# Patient Record
Sex: MALE | Race: BLACK OR AFRICAN AMERICAN | Employment: PART TIME | URBAN - METROPOLITAN AREA
[De-identification: names, ages, dates, MRNs, and addresses within clinical notes are randomized per-mention and may not be internally consistent; named-entity substitution may affect disease eponyms.]

---

## 2019-05-10 NOTE — PRE-PROCEDURE
Oral and Maxillofacial Surgery  Pre-Op Note    Surgery Date: 5/15/19    Pre-op Dx: Gross caries and periodontal disease    Planned Procedure: Extraction of remaining dentition (teeth #5, 6, 11, 21, 22, 27, 28, 29), bilateral mandibular jerri removal, and alveoloplasty of bilateral maxillary buccal exostoses.    Planned Anesthesia: GA w/ ETT    Allergies: NKDA    Pre-Operative Medications: Per anesthesia    Resident Surgeon:   JOEL Land DDS    Staff Surgeon:   Amelia Owusu DDS    Risks and Complications discussed with patient/guardian/parents to include, but not limited to bleeding, infection, swelling, pain, temporary/permanent hypoesthesia/paresthesia CN V3 mental nerve/CN V2 maxillary nerve distribution, failure of treatment, need for additional  treatment/procedures. Consent will be written. All questions were answered.    Edwin Posada DDS  [facility] [place] resident  Pager: [phone]    [facility] Procedure Note - Aborted    Patient presented to clinic for extraction of remaining dentition (teeth #5, 6, 11, 21, 22, 27, 28, 29), bilateral mandibular jerri removal, and alveoloplasty of bilateral maxillary buccal exostoses.  Patient was very apprehensive about the procedure and his dental student, Anson, came down to talk to him about his treatment plan.  Patient visibly very anxious and agitated throughout conversation, but agreed to proceed using local anesthesia and nitrous oxide (free of charge).      Pre-op dx: Chronic generalized periodontitis, bilateral mandibular jerri, bilateral maxillary buccal exostoses, hypertension, bipolar disorder, depression, anxiety, asthma.  Post-op dx: Same.    BP: 142/99    N20 3 L/min, O2 3 L/min via nasal henriquez x 10 min.  100% O2 3 L/min x 5 min post-op.    Description of Procedure:  Patient was seated in the dental chair and nitrous oxide was administered via nasal henriquez.  Placed topical anesthesia.  Patient was very agitated throughout this process.  Upon  attempt to deliver local anesthesia, the patient would not open and stated he was going to grab this provider's hand if he felt anything.  Informed the patient that this was not safe and we would not be able to complete the procedure if he grabbed the hands of the staff during the operation.  Patient continued to be uncooperative for local anesthesia injection and the decision was made to abort the procedure due to concern for patient and provider safety.  O2 administered for 5 minutes upon termination of nitrous.    Disposition:  The patient will be taken to the OR for the procedure as he was unable to tolerate the procedure in clinic and he is not an in office IV sedation candiate.  Insurance prior authorization will be sent and patient will be contacted to schedule pending approval.    The case was discussed with staff surgeon Dr. Amelia Wilson, GARETH  Oral & Maxillofacial Surgery PGY3        See student/resident's note above for details. As the approving(supervising) , I attest that I've seen and evaluated the patient, was present for the critical or key portions of the treatment and agree with the student/resident's treatments, findings and plans as written.  Electronically signed by: Amelia Walton 11/20/2018 @ 04:22:00 PM

## 2019-05-14 ENCOUNTER — ANESTHESIA EVENT (OUTPATIENT)
Dept: SURGERY | Facility: CLINIC | Age: 49
End: 2019-05-14
Payer: MEDICARE

## 2019-05-14 ASSESSMENT — LIFESTYLE VARIABLES: TOBACCO_USE: 1

## 2019-05-14 NOTE — ANESTHESIA PREPROCEDURE EVALUATION
Anesthesia Pre-Procedure Evaluation    Patient: Jasson George   MRN:     [id number] Gender:   male   Age:    48 year old :      1970        Preoperative Diagnosis: Chronic Generalized Periodontitis, Bilateral Mandibular Sara, Bilateral Maxillary Buccal Exostoses, Hypertension, Bipolar Disorder, Depression, Anxiety, Asthma   Procedure(s):  Removal Bilateral Mandible Sara  Alveoloplasty Four Quadrants  Extraction #5,6,11,21,22,27,28,29     No past medical history on file.   No past surgical history on file.       Anesthesia Evaluation     . Pt has not had prior anesthetic            ROS/MED HX    ENT/Pulmonary:     (+)tobacco use, 1 packs/day  asthma , . .    Neurologic:  - neg neurologic ROS     Cardiovascular:     (+) hypertension----. : . . . :. . No previous cardiac testing       METS/Exercise Tolerance:     Hematologic:  - neg hematologic  ROS       Musculoskeletal:  - neg musculoskeletal ROS       GI/Hepatic:  - neg GI/hepatic ROS       Renal/Genitourinary:  - ROS Renal section negative       Endo:     (+) Obesity, .      Psychiatric:     (+) psychiatric history anxiety, bipolar and depression      Infectious Disease:  - neg infectious disease ROS       Malignancy:      - no malignancy   Other:    (+) C-spine cleared: N/A, no H/O Chronic Pain,no other significant disability                        PHYSICAL EXAM:   Mental Status/Neuro: A/A/O   Airway: Facies: Feasible  Mallampati: I  Mouth/Opening: Full  TM distance: > 6 cm  Neck ROM: Full   Respiratory: Auscultation: CTAB     Resp. Rate: Normal     Resp. Effort: Normal      CV: Rhythm: Regular  Rate: Age appropriate  Heart: Normal Sounds   Comments:                    No results found for: WBC, HGB, HCT, PLT, CRP, SED, NA, POTASSIUM, CHLORIDE, CO2, BUN, CR, GLC, VIOLA, PHOS, MAG, ALBUMIN, PROTTOTAL, ALT, AST, GGT, ALKPHOS, BILITOTAL, BILIDIRECT, LIPASE, AMYLASE, ANGELICA, PTT, INR, FIBR, TSH, T4, T3, HCG, HCGS, CKTOTAL, CKMB, TROPN    Preop Vitals  BP  Readings from Last 3 Encounters:   No data found for BP    Pulse Readings from Last 3 Encounters:   No data found for Pulse      Resp Readings from Last 3 Encounters:   No data found for Resp    SpO2 Readings from Last 3 Encounters:   No data found for SpO2      Temp Readings from Last 1 Encounters:   No data found for Temp    Ht Readings from Last 1 Encounters:   No data found for Ht      Wt Readings from Last 1 Encounters:   No data found for Wt    There is no height or weight on file to calculate BMI.     LDA:            Assessment:   ASA SCORE: 2       Documentation: H&P complete; Preop Testing complete; Consents complete   Proceeding: Proceed without further delay  Tobacco Use:  NO Active use of Tobacco/UNKNOWN Tobacco use status     Plan:   Anes. Type:  General   Pre-Induction: Midazolam IV; Acetaminophen PO   Induction:  IV (Standard)   Airway: Nasal ETT   Access/Monitoring: PIV   Maintenance: Balanced   Emergence: Procedure Site   Logistics: Same Day Surgery     Postop Pain/Sedation Strategy:  Standard-Options: Opioids PRN     PONV Management:  Adult Risk Factors:, Non-Smoker, Postop Opioids  Prevention: Ondansetron; Dexamethasone                         Brandi Delarosa MD

## 2019-05-15 ENCOUNTER — HOSPITAL ENCOUNTER (OUTPATIENT)
Facility: CLINIC | Age: 49
Discharge: HOME | End: 2019-05-15
Attending: DENTIST | Admitting: DENTIST
Payer: MEDICARE

## 2019-05-15 ENCOUNTER — ANESTHESIA (OUTPATIENT)
Dept: SURGERY | Facility: CLINIC | Age: 49
End: 2019-05-15
Payer: MEDICARE

## 2019-05-15 VITALS
OXYGEN SATURATION: 94 % | DIASTOLIC BLOOD PRESSURE: 98 MMHG | HEART RATE: 80 BPM | TEMPERATURE: 98.3 F | RESPIRATION RATE: 16 BRPM | BODY MASS INDEX: 39.4 KG/M2 | HEIGHT: 66 IN | SYSTOLIC BLOOD PRESSURE: 148 MMHG | WEIGHT: 245.15 LBS

## 2019-05-15 DIAGNOSIS — K08.409 S/P TOOTH EXTRACTION: Primary | ICD-10-CM

## 2019-05-15 LAB
GLUCOSE BLDC GLUCOMTR-MCNC: 107 MG/DL (ref 70–99)
HGB BLD-MCNC: 15.3 G/DL (ref 13.3–17.7)

## 2019-05-15 PROCEDURE — 36000064 ZZH SURGERY LEVEL 4 EA 15 ADDTL MIN - UMMC: Performed by: DENTIST

## 2019-05-15 PROCEDURE — 85018 HEMOGLOBIN: CPT | Performed by: ANESTHESIOLOGY

## 2019-05-15 PROCEDURE — 25000128 H RX IP 250 OP 636: Performed by: STUDENT IN AN ORGANIZED HEALTH CARE EDUCATION/TRAINING PROGRAM

## 2019-05-15 PROCEDURE — 71000027 ZZH RECOVERY PHASE 2 EACH 15 MINS: Performed by: DENTIST

## 2019-05-15 PROCEDURE — 36000062 ZZH SURGERY LEVEL 4 1ST 30 MIN - UMMC: Performed by: DENTIST

## 2019-05-15 PROCEDURE — 82962 GLUCOSE BLOOD TEST: CPT

## 2019-05-15 PROCEDURE — 25000566 ZZH SEVOFLURANE, EA 15 MIN: Performed by: DENTIST

## 2019-05-15 PROCEDURE — A9270 NON-COVERED ITEM OR SERVICE: HCPCS | Mod: GY | Performed by: STUDENT IN AN ORGANIZED HEALTH CARE EDUCATION/TRAINING PROGRAM

## 2019-05-15 PROCEDURE — 25000132 ZZH RX MED GY IP 250 OP 250 PS 637: Mod: GY | Performed by: STUDENT IN AN ORGANIZED HEALTH CARE EDUCATION/TRAINING PROGRAM

## 2019-05-15 PROCEDURE — 25800030 ZZH RX IP 258 OP 636: Performed by: ANESTHESIOLOGY

## 2019-05-15 PROCEDURE — A9270 NON-COVERED ITEM OR SERVICE: HCPCS | Performed by: ANESTHESIOLOGY

## 2019-05-15 PROCEDURE — 37000008 ZZH ANESTHESIA TECHNICAL FEE, 1ST 30 MIN: Performed by: DENTIST

## 2019-05-15 PROCEDURE — 27210794 ZZH OR GENERAL SUPPLY STERILE: Performed by: DENTIST

## 2019-05-15 PROCEDURE — 25000132 ZZH RX MED GY IP 250 OP 250 PS 637: Performed by: ANESTHESIOLOGY

## 2019-05-15 PROCEDURE — 40000170 ZZH STATISTIC PRE-PROCEDURE ASSESSMENT II: Performed by: DENTIST

## 2019-05-15 PROCEDURE — 25000125 ZZHC RX 250: Performed by: STUDENT IN AN ORGANIZED HEALTH CARE EDUCATION/TRAINING PROGRAM

## 2019-05-15 PROCEDURE — 71000015 ZZH RECOVERY PHASE 1 LEVEL 2 EA ADDTL HR: Performed by: DENTIST

## 2019-05-15 PROCEDURE — 37000009 ZZH ANESTHESIA TECHNICAL FEE, EACH ADDTL 15 MIN: Performed by: DENTIST

## 2019-05-15 PROCEDURE — 71000014 ZZH RECOVERY PHASE 1 LEVEL 2 FIRST HR: Performed by: DENTIST

## 2019-05-15 PROCEDURE — 25000125 ZZHC RX 250: Performed by: DENTIST

## 2019-05-15 RX ORDER — IBUPROFEN 600 MG/1
600 TABLET, FILM COATED ORAL EVERY 6 HOURS PRN
Qty: 30 TABLET | Refills: 0 | Status: SHIPPED | OUTPATIENT
Start: 2019-05-15

## 2019-05-15 RX ORDER — CHLORHEXIDINE GLUCONATE ORAL RINSE 1.2 MG/ML
10 SOLUTION DENTAL ONCE
Status: COMPLETED | OUTPATIENT
Start: 2019-05-15 | End: 2019-05-15

## 2019-05-15 RX ORDER — CHLORHEXIDINE GLUCONATE ORAL RINSE 1.2 MG/ML
SOLUTION DENTAL
Qty: 473 ML | Refills: 0 | Status: SHIPPED | OUTPATIENT
Start: 2019-05-15

## 2019-05-15 RX ORDER — ACETAMINOPHEN 325 MG/1
650 TABLET ORAL ONCE
Status: COMPLETED | OUTPATIENT
Start: 2019-05-15 | End: 2019-05-15

## 2019-05-15 RX ORDER — BUPIVACAINE HYDROCHLORIDE AND EPINEPHRINE 5; 5 MG/ML; UG/ML
INJECTION, SOLUTION PERINEURAL PRN
Status: DISCONTINUED | OUTPATIENT
Start: 2019-05-15 | End: 2019-05-15 | Stop reason: HOSPADM

## 2019-05-15 RX ORDER — SODIUM CHLORIDE, SODIUM LACTATE, POTASSIUM CHLORIDE, CALCIUM CHLORIDE 600; 310; 30; 20 MG/100ML; MG/100ML; MG/100ML; MG/100ML
INJECTION, SOLUTION INTRAVENOUS CONTINUOUS
Status: DISCONTINUED | OUTPATIENT
Start: 2019-05-15 | End: 2019-05-15 | Stop reason: HOSPADM

## 2019-05-15 RX ORDER — NALOXONE HYDROCHLORIDE 0.4 MG/ML
.1-.4 INJECTION, SOLUTION INTRAMUSCULAR; INTRAVENOUS; SUBCUTANEOUS
Status: DISCONTINUED | OUTPATIENT
Start: 2019-05-15 | End: 2019-05-15 | Stop reason: HOSPADM

## 2019-05-15 RX ORDER — LABETALOL 20 MG/4 ML (5 MG/ML) INTRAVENOUS SYRINGE
10
Status: DISCONTINUED | OUTPATIENT
Start: 2019-05-15 | End: 2019-05-15 | Stop reason: HOSPADM

## 2019-05-15 RX ORDER — ONDANSETRON 2 MG/ML
INJECTION INTRAMUSCULAR; INTRAVENOUS PRN
Status: DISCONTINUED | OUTPATIENT
Start: 2019-05-15 | End: 2019-05-15

## 2019-05-15 RX ORDER — ONDANSETRON 2 MG/ML
4 INJECTION INTRAMUSCULAR; INTRAVENOUS EVERY 30 MIN PRN
Status: DISCONTINUED | OUTPATIENT
Start: 2019-05-15 | End: 2019-05-15 | Stop reason: HOSPADM

## 2019-05-15 RX ORDER — FENTANYL CITRATE 50 UG/ML
25-50 INJECTION, SOLUTION INTRAMUSCULAR; INTRAVENOUS
Status: DISCONTINUED | OUTPATIENT
Start: 2019-05-15 | End: 2019-05-15 | Stop reason: HOSPADM

## 2019-05-15 RX ORDER — DEXAMETHASONE SODIUM PHOSPHATE 4 MG/ML
INJECTION, SOLUTION INTRA-ARTICULAR; INTRALESIONAL; INTRAMUSCULAR; INTRAVENOUS; SOFT TISSUE PRN
Status: DISCONTINUED | OUTPATIENT
Start: 2019-05-15 | End: 2019-05-15

## 2019-05-15 RX ORDER — CEFAZOLIN SODIUM 2 G/100ML
2 INJECTION, SOLUTION INTRAVENOUS
Status: COMPLETED | OUTPATIENT
Start: 2019-05-15 | End: 2019-05-15

## 2019-05-15 RX ORDER — ONDANSETRON 4 MG/1
4 TABLET, ORALLY DISINTEGRATING ORAL EVERY 30 MIN PRN
Status: DISCONTINUED | OUTPATIENT
Start: 2019-05-15 | End: 2019-05-15 | Stop reason: HOSPADM

## 2019-05-15 RX ORDER — PROPOFOL 10 MG/ML
INJECTION, EMULSION INTRAVENOUS PRN
Status: DISCONTINUED | OUTPATIENT
Start: 2019-05-15 | End: 2019-05-15

## 2019-05-15 RX ORDER — CEFAZOLIN SODIUM 1 G/3ML
1 INJECTION, POWDER, FOR SOLUTION INTRAMUSCULAR; INTRAVENOUS SEE ADMIN INSTRUCTIONS
Status: DISCONTINUED | OUTPATIENT
Start: 2019-05-15 | End: 2019-05-15 | Stop reason: HOSPADM

## 2019-05-15 RX ORDER — FENTANYL CITRATE 50 UG/ML
INJECTION, SOLUTION INTRAMUSCULAR; INTRAVENOUS PRN
Status: DISCONTINUED | OUTPATIENT
Start: 2019-05-15 | End: 2019-05-15

## 2019-05-15 RX ORDER — LIDOCAINE HYDROCHLORIDE 20 MG/ML
INJECTION, SOLUTION INFILTRATION; PERINEURAL PRN
Status: DISCONTINUED | OUTPATIENT
Start: 2019-05-15 | End: 2019-05-15

## 2019-05-15 RX ORDER — IBUPROFEN 600 MG/1
600 TABLET, FILM COATED ORAL EVERY 6 HOURS PRN
Status: DISCONTINUED | OUTPATIENT
Start: 2019-05-15 | End: 2019-05-15 | Stop reason: HOSPADM

## 2019-05-15 RX ORDER — MEPERIDINE HYDROCHLORIDE 25 MG/ML
12.5 INJECTION INTRAMUSCULAR; INTRAVENOUS; SUBCUTANEOUS
Status: DISCONTINUED | OUTPATIENT
Start: 2019-05-15 | End: 2019-05-15 | Stop reason: HOSPADM

## 2019-05-15 RX ORDER — ACETAMINOPHEN 325 MG/1
650 TABLET ORAL EVERY 4 HOURS PRN
Qty: 50 TABLET | Refills: 0 | Status: SHIPPED | OUTPATIENT
Start: 2019-05-15

## 2019-05-15 RX ORDER — LABETALOL 20 MG/4 ML (5 MG/ML) INTRAVENOUS SYRINGE
PRN
Status: DISCONTINUED | OUTPATIENT
Start: 2019-05-15 | End: 2019-05-15

## 2019-05-15 RX ORDER — LIDOCAINE 40 MG/G
CREAM TOPICAL
Status: DISCONTINUED | OUTPATIENT
Start: 2019-05-15 | End: 2019-05-15 | Stop reason: HOSPADM

## 2019-05-15 RX ADMIN — FENTANYL CITRATE 50 MCG: 50 INJECTION, SOLUTION INTRAMUSCULAR; INTRAVENOUS at 11:55

## 2019-05-15 RX ADMIN — ROCURONIUM BROMIDE 50 MG: 10 INJECTION INTRAVENOUS at 11:25

## 2019-05-15 RX ADMIN — FENTANYL CITRATE 100 MCG: 50 INJECTION, SOLUTION INTRAMUSCULAR; INTRAVENOUS at 11:25

## 2019-05-15 RX ADMIN — LABETALOL 20 MG/4 ML (5 MG/ML) INTRAVENOUS SYRINGE 5 MG: at 12:17

## 2019-05-15 RX ADMIN — MIDAZOLAM 1 MG: 1 INJECTION INTRAMUSCULAR; INTRAVENOUS at 11:17

## 2019-05-15 RX ADMIN — CEFAZOLIN SODIUM 2 G: 2 INJECTION, SOLUTION INTRAVENOUS at 11:43

## 2019-05-15 RX ADMIN — ROCURONIUM BROMIDE 20 MG: 10 INJECTION INTRAVENOUS at 12:08

## 2019-05-15 RX ADMIN — ONDANSETRON 4 MG: 2 INJECTION INTRAMUSCULAR; INTRAVENOUS at 12:01

## 2019-05-15 RX ADMIN — LIDOCAINE HYDROCHLORIDE 100 MG: 20 INJECTION, SOLUTION INFILTRATION; PERINEURAL at 11:25

## 2019-05-15 RX ADMIN — ROCURONIUM BROMIDE 10 MG: 10 INJECTION INTRAVENOUS at 12:45

## 2019-05-15 RX ADMIN — SUGAMMADEX 250 MG: 100 INJECTION, SOLUTION INTRAVENOUS at 13:16

## 2019-05-15 RX ADMIN — PROPOFOL 30 MG: 10 INJECTION, EMULSION INTRAVENOUS at 11:54

## 2019-05-15 RX ADMIN — PROPOFOL 150 MG: 10 INJECTION, EMULSION INTRAVENOUS at 11:25

## 2019-05-15 RX ADMIN — FENTANYL CITRATE 50 MCG: 50 INJECTION, SOLUTION INTRAMUSCULAR; INTRAVENOUS at 12:10

## 2019-05-15 RX ADMIN — DEXAMETHASONE SODIUM PHOSPHATE 6 MG: 4 INJECTION, SOLUTION INTRA-ARTICULAR; INTRALESIONAL; INTRAMUSCULAR; INTRAVENOUS; SOFT TISSUE at 11:49

## 2019-05-15 RX ADMIN — LABETALOL 20 MG/4 ML (5 MG/ML) INTRAVENOUS SYRINGE 5 MG: at 12:22

## 2019-05-15 RX ADMIN — SODIUM CHLORIDE, POTASSIUM CHLORIDE, SODIUM LACTATE AND CALCIUM CHLORIDE: 600; 310; 30; 20 INJECTION, SOLUTION INTRAVENOUS at 11:20

## 2019-05-15 RX ADMIN — CHLORHEXIDINE GLUCONATE 0.12% ORAL RINSE 10 ML: 1.2 LIQUID ORAL at 11:04

## 2019-05-15 RX ADMIN — ACETAMINOPHEN 650 MG: 325 TABLET, FILM COATED ORAL at 15:20

## 2019-05-15 RX ADMIN — PROPOFOL 50 MG: 10 INJECTION, EMULSION INTRAVENOUS at 11:36

## 2019-05-15 RX ADMIN — LABETALOL 20 MG/4 ML (5 MG/ML) INTRAVENOUS SYRINGE 5 MG: at 12:26

## 2019-05-15 RX ADMIN — FENTANYL CITRATE 25 MCG: 50 INJECTION INTRAMUSCULAR; INTRAVENOUS at 14:06

## 2019-05-15 RX ADMIN — FENTANYL CITRATE 50 MCG: 50 INJECTION INTRAMUSCULAR; INTRAVENOUS at 14:19

## 2019-05-15 ASSESSMENT — MIFFLIN-ST. JEOR: SCORE: 1919.75

## 2019-05-15 NOTE — OR NURSING
Pt's dentist, Lico, to bedside to discuss pt's partials with him. He will leave them with his wife.

## 2019-05-15 NOTE — DISCHARGE INSTRUCTIONS
[facility]  Same-Day Surgery   Adult Discharge Orders & Instructions     For 24 hours after surgery    1. Get plenty of rest.  A responsible adult must stay with you for at least 24 hours after you leave the hospital.   2. Do not drive or use heavy equipment.  If you have weakness or tingling, don't drive or use heavy equipment until this feeling goes away.  3. Do not drink alcohol.  4. Avoid strenuous or risky activities.  Ask for help when climbing stairs.   5. You may feel lightheaded.  IF so, sit for a few minutes before standing.  Have someone help you get up.   6. If you have nausea (feel sick to your stomach): Drink only clear liquids such as apple juice, ginger ale, broth or 7-Up.  Rest may also help.  Be sure to drink enough fluids.  Move to a regular diet as you feel able.  7. You may have a slight fever. Call the doctor if your fever is over 100 F (37.7 C) (taken under the tongue) or lasts longer than 24 hours.  8. You may have a dry mouth, a sore throat, muscle aches or trouble sleeping.  These should go away after 24 hours.  9. Do not make important or legal decisions.   Call your doctor for any of the followin.  Signs of infection (fever, growing tenderness at the surgery site, a large amount of drainage or bleeding, severe pain, foul-smelling drainage, redness, swelling).    2. It has been over 8 to 10 hours since surgery and you are still not able to urinate (pass water).    3.  Headache for over 24 hours.    To contact a doctor, call Dr. Franklin at [phone]    or:        [phone] and ask for the resident on call for   Oral Surgery (answered 24 hours a day)      Emergency Department:    [facility]: [phone]       (TTY for hearing impaired: [phone])    [facility]: [phone]       (TTY for hearing impaired: [phone])

## 2019-05-15 NOTE — ANESTHESIA CARE TRANSFER NOTE
Patient: Jasson [facility]    Procedure(s):  ,Removal of bilateral mandible jerri  Alveoloplasty Four Quadrants  Extraction #5,6,11,21,22,27,,29    Diagnosis: Chronic Generalized Periodontitis, Bilateral Mandibular Jerri, Bilateral Maxillary Buccal Exostoses, Hypertension, Bipolar Disorder, Depression, Anxiety, Asthma  Diagnosis Additional Information: No value filed.    Anesthesia Type:   No value filed.     Note:  Airway :Face Mask  Patient transferred to:PACU  Comments: Airway :Face Mask  Patient transferred to:PACU  Comments: Prior to extubation, patient was breathing spontaneously with appropriate respiratory rate and tidal volume. The patient was following commands, warm and demonstrated adequate strength. Patient was suctioned and extubated without complication.   Transported to PACU on 6L O2 via face mask.   VSS upon arrival to PACU.  Patient denies nausea or pain at this time.   Care transfer plan communicated and patient care transferred to PACU LENIN Delarosa MD  Anesthesia Resident - CA1  5/15/2019  1:49 PMHandoff Report: Identifed the Patient, Identified the Reponsible Provider, Reviewed the pertinent medical history, Discussed the surgical course, Reviewed Intra-OP anesthesia mangement and issues during anesthesia, Set expectations for post-procedure period and Allowed opportunity for questions and acknowledgement of understanding      Vitals: (Last set prior to Anesthesia Care Transfer)    CRNA VITALS  5/15/2019 1259 - 5/15/2019 1348      5/15/2019             Pulse:  79    SpO2:  87 %  (Abnormal)                 Electronically Signed By: Brandi Delarosa MD  May 15, 2019  1:48 PM

## 2019-05-15 NOTE — BRIEF OP NOTE
[facility] [place]    Brief Operative Note    Pre-operative diagnosis: Chronic Generalized Periodontitis, Bilateral Mandibular Sara, Bilateral Maxillary Buccal Exostoses, Hypertension, Bipolar Disorder, Depression, Anxiety, Asthma  Post-operative diagnosis * No post-op diagnosis entered *  Procedure: Procedure(s):  ,Removal of bilateral mandible sara  Alveoloplasty Four Quadrants  Extraction #5,6,11,21,22,27,,29  Surgeon: Surgeon(s) and Role:     * Amelia Owusu DDS - Primary     * Edwin Posada - Resident - Assisting  Anesthesia: General, oral endotracheal tube   Estimated blood loss: 20cc  Drains:  None  Specimens: None  Findings: None    Complications: None .  Implants:  * No implants in log *

## 2019-05-15 NOTE — ANESTHESIA POSTPROCEDURE EVALUATION
Anesthesia POST Procedure Evaluation    Patient: Jasson George   MRN:     [id number] Gender:   male   Age:    48 year old :      1970        Preoperative Diagnosis: Chronic Generalized Periodontitis, Bilateral Mandibular Sara, Bilateral Maxillary Buccal Exostoses, Hypertension, Bipolar Disorder, Depression, Anxiety, Asthma   Procedure(s):  ,Removal of bilateral mandible sara  Alveoloplasty Four Quadrants  Extraction #5,6,11,21,22,27,,29   Postop Comments: No value filed.       Anesthesia Type:  General  No value filed.    Reportable Event: NO     PAIN: Uncomplicated   Sign Out status: Comfortable, Well controlled pain     PONV: No PONV   Sign Out status:  No Nausea or Vomiting     Neuro/Psych: Uneventful perioperative course   Sign Out Status: Preoperative baseline; Age appropriate mentation     Airway/Resp.: Uneventful perioperative course   Sign Out Status: Non labored breathing, age appropriate RR; Resp. Status within EXPECTED Parameters     CV: Uneventful perioperative course   Sign Out status: Appropriate BP and perfusion indices; Appropriate HR/Rhythm     Disposition:   Sign Out in:  PACU  Disposition:  Phase II; Home  Recovery Course: Uneventful  Follow-Up: Not required           Last Anesthesia Record Vitals:  CRNA VITALS  5/15/2019 1259 - 5/15/2019 1359      5/15/2019             Pulse:  79    SpO2:  87 %  (Abnormal)           Last PACU Vitals:  Vitals Value Taken Time   /97 5/15/2019  2:56 PM   Temp 36.9  C (98.4  F) 5/15/2019  2:56 PM   Pulse 89 5/15/2019  2:56 PM   Resp 20 5/15/2019  2:56 PM   SpO2 95 % 5/15/2019  2:56 PM   Temp src     NIBP     Pulse     SpO2     Resp     Temp     Ht Rate     Temp 2           Electronically Signed By: Wesley Denise MD, May 15, 2019, 3:01 PM

## 2019-05-16 NOTE — OP NOTE
STAFF SURGEON:  Amelia Owusu DDS      RESIDENT SURGEON:  Edwin Posada DDS        PREOPERATIVE DIAGNOSIS: gross caries and severe chronic generalized periodontal disease. Maxillary exostosis, mandibular jerri      POSTOPERATIVE DIAGNOSIS:  Same      PROCEDURES PERFORMED:   Extraction #5, 6, 11,21,27 and 29; Maxillary and mandibular alveoplasty, Mandibular jerri removal      BLOOD LOSS: 20 cc      ANESTHESIA:  General anesthesia with oral tracheal intubation with local anesthesia via 8 ml of 0.5% marcaine with 1:200,000 epinephrine via maxillary local infiltration      COMPLICATIONS:  None.      DRAINS:  None.      IMPLANTS:  None      INDICATIONS FOR OPERATION:  Jasson is a patient with history of severe dental axiety who was referred to the [facility] clinic for extraction of teeth #5, 6, 11,21,27 and 29. OMS service was consulted. After evaluation of the patient clinically as well as radiographically, it was recommended to the patient that the patient will be taken to the operating room under general anesthesia for extraction of their teeth. The risks and benefits of the procedure were extensively discussed with the patient including but not limited to, bleeding, bruising, postoperative pain, infection, damage to the inferior alveolar nerve, perforation into the maxillary sinus, damage to adjacent structures, including but not limited to, mandibular alveolar bone maxillary alveolar bone, as well as failure of bone to heal, and need for additional procedures in the future. After a thorough discussion of the risks and benefits, the patient expressed understanding and consented to the procedure.       DESCRIPTION OF PROCEDURE:  The patient was met in the preoperative holding area on 5/15/19.  Again, the risks and benefits were discussed with the patient and signed written and verbal consent was obtained from the patient, parent and/or guardian. The patient was then taken to the operating by the anesthesia service. The  patient was placed in supine position.  The patient was appropriately padded.  All standard ASA monitors were applied.  The patient then was then induced by the Anesthesia service and a secure airway was placed without complication. The airway was secured by the Oral and Maxillofacial Surgery service. Then a time-out was conducted and then the patient was prepped, cleaned the oral cavity with chlorhexidine rinse, and received local anesthesia as previously mentioned. Surgeons left to scrub and returned to don sterile gown and gloves. The patient was prepped in standard fashion. A second formal time-out was conducted per [facility] protocols.        Upper right quadrant: Teeth #5 and 6, a 15 blade was used to create a sulcular incision from mesial of #6 and extended on to crest of ridge to maxillary tuberosity with distal buccal release.  A full thickness mucoperiosteal flap was raised and the maxillary exostoses were removed with an acrylic bur with copious irrigation. Teeth #5 and 6 were retrieved with #150 forceps.  The sockets were curetted. Any protruding bone reduced with a rongeur.  The sockets and flap were then irrigated with sterile saline.      Upper left quadrant: Tooth #11, a 15 blade was used to create a sulcular incision from mesial of #11 and extended on to crest of ridge to maxillary tuberosity with distal buccal release.  A full thickness mucoperiosteal flap was raised and the maxillary exostoses were removed with an acrylic bur with copious irrigation. Tooth #11 was retrieved with #150 forceps.  The socket was curetted. Any protruding bone reduced with a rongeur.  The sockets and flap were then irrigated with sterile saline. The maxillary surgical guide was then placed to confirm bone reduction. The gingiva in the maxilla was then reapproximated with running locking 3-0 chromic gut suture.      Lower left quadrant. Teeth #21 and 22, a 15 blade was used to create a  sulcular incision from mesial of tooth #22 and then extended on to crest of ridge to edentulous site #19 with distal buccal release.  A full thickness mucoperiosteal flap was raised. Teeth #21 and 22 were retrieved with Berto forceps. The lingual gingiva was reflect to exposed mandibular jerri. A seldon was used to protect the FOM mucosa. An acrylic bur with copious irrigation was used to remove mandibular jerri. The sockets were curetted. Any protruding bone reduced with a rongeur.  The sockets and flap were then irrigated with a copious amount sterile saline. Gel foam was placed in the are of where jerri were removed. The gingiva was then reapproximated with running locking 3-0 chromic gut suture.        Lower right quadrant. Tooth #28 was noted to not be present prior to surgery. Teeth #27 and 29, a 15 blade was used to create a sulcular incision from mesial of tooth #27 and then extended on to crest of ridge to edentulous site #30 with distal buccal release.  A full thickness mucoperiosteal flap was raised. Teeth #27 and 29 were retrieved with Berto forceps. The lingual gingiva was reflect to exposed mandibular jerri. A seldon was used to protect the FOM mucosa. An acrylic bur with copious irrigation was used to remove mandibular jerri. The sockets were curetted. Any protruding bone reduced with a rongeur.  The sockets and flap were then irrigated with a copious amount sterile saline. Gel foam was placed in the are of where jerri were removed. The gingiva was then reapproximated with running locking 3-0 chromic gut suture.The oral cavity was then irrigated, suctioned and the throat pack was removed. The patients face was cleaned with wet gauze. Orogastric tube was passed. Two ghost style oral packs were placed in the posterior, bilaterally to aid in hemostasis.      The patient was then turned back over to the anesthesia service where the patient was extubated without complications and transferred to the post anesthesia  care unit in stable condition. All counts were correct.      Edwin Posada DDS  [facility] [place] resident PGYI  [phone]

## (undated) DEVICE — PAD CHUX UNDERPAD 23X24" 7136

## (undated) DEVICE — BUR STRK EGG 4.0X44.5MM 10 FLUTE 1607-002-035

## (undated) DEVICE — PREP POVIDONE IODINE SOLUTION 10% 4OZ

## (undated) DEVICE — PACK NEURO MINOR UMMC SNE32MNMU4

## (undated) DEVICE — LINEN TOWEL PACK X6 WHITE 5487

## (undated) DEVICE — ESU GROUND PAD ADULT W/CORD E7507

## (undated) DEVICE — LINEN TOWEL PACK X5 5464

## (undated) DEVICE — SPONGE SURGIFOAM 100 1974

## (undated) DEVICE — GLOVE PROTEXIS W/NEU-THERA 6.5  2D73TE65

## (undated) DEVICE — TOOTHBRUSH ADULT NON STERILE MDS136850

## (undated) DEVICE — PREP POVIDONE IODINE SCRUB 7.5% 4OZ APL82212

## (undated) DEVICE — PREP SKIN SCRUB TRAY 4461A

## (undated) DEVICE — BUR STRK SIDE CUT 1.6X5.1X44.8MM CARBIDE 6FLUTE 1607-002-107

## (undated) DEVICE — LIGHT HANDLE X1 31140133

## (undated) DEVICE — SU CHROMIC 3-0 FS-2 27" 636

## (undated) DEVICE — SYR EAR BULB 3OZ 0035830

## (undated) DEVICE — GLOVE PROTEXIS BLUE W/NEU-THERA 7.0  2D73EB70

## (undated) DEVICE — ADH LIQUID MASTISOL TOPICAL VIAL 2-3ML 0523-48

## (undated) RX ORDER — FENTANYL CITRATE 50 UG/ML
INJECTION, SOLUTION INTRAMUSCULAR; INTRAVENOUS
Status: DISPENSED
Start: 2019-05-15

## (undated) RX ORDER — CEFAZOLIN SODIUM 2 G/100ML
INJECTION, SOLUTION INTRAVENOUS
Status: DISPENSED
Start: 2019-05-15

## (undated) RX ORDER — ACETAMINOPHEN 325 MG/1
TABLET ORAL
Status: DISPENSED
Start: 2019-05-15

## (undated) RX ORDER — CHLORHEXIDINE GLUCONATE ORAL RINSE 1.2 MG/ML
SOLUTION DENTAL
Status: DISPENSED
Start: 2019-05-15